# Patient Record
Sex: FEMALE | Employment: PART TIME | URBAN - METROPOLITAN AREA
[De-identification: names, ages, dates, MRNs, and addresses within clinical notes are randomized per-mention and may not be internally consistent; named-entity substitution may affect disease eponyms.]

---

## 2023-04-19 ENCOUNTER — OFFICE VISIT (OUTPATIENT)
Dept: ORTHOPEDIC SURGERY | Age: 23
End: 2023-04-19

## 2023-04-19 VITALS — HEIGHT: 63 IN | RESPIRATION RATE: 14 BRPM | BODY MASS INDEX: 28.35 KG/M2 | WEIGHT: 160 LBS

## 2023-04-19 DIAGNOSIS — M75.22 BICEPS TENDONITIS, LEFT: Primary | ICD-10-CM

## 2023-04-19 DIAGNOSIS — S43.431A SUPERIOR LABRUM ANTERIOR-TO-POSTERIOR (SLAP) TEAR OF RIGHT SHOULDER: ICD-10-CM

## 2023-04-19 DIAGNOSIS — M25.511 RIGHT SHOULDER PAIN, UNSPECIFIED CHRONICITY: ICD-10-CM

## 2023-04-19 RX ORDER — SPIRONOLACTONE 50 MG/1
50 TABLET, FILM COATED ORAL DAILY
COMMUNITY

## 2023-04-19 RX ORDER — DICLOFENAC SODIUM 75 MG/1
75 TABLET, DELAYED RELEASE ORAL 2 TIMES DAILY
Qty: 28 TABLET | Refills: 0 | Status: SHIPPED | OUTPATIENT
Start: 2023-04-19

## 2023-04-19 RX ORDER — NORETHINDRONE ACETATE AND ETHINYL ESTRADIOL 1MG-20(21)
1 KIT ORAL DAILY
COMMUNITY

## 2023-04-22 NOTE — PROGRESS NOTES
Orthopedic Shoulder Encounter Note     Chief complaint: right shoulder pain    HPI: [place]u [place] is a 25 y.o.  right-hand dominant female who presents for evaluation of his right shoulder. She indicates that she is to play softball. Plain 2 years ago. She was having quite a bit of pain in the last day of her plain. When she stopped playing her pain got better but her shoulder started bothering her more in the past year. Her pain is primarily localized to the anterior and posterior aspect of the shoulder. It is fairly constant. She does have some weakness and mild stiffness. She also has some pain at night. Her pain sometimes radiates distally into the biceps muscle. Previous treatment:    NSAIDs: Ibuprofen    Physical Therapy: No    Injections: None     Surgeries: None    Review of Systems:   Constitutional: Negative for fever, chills, sweats. Pain Score:   4  Neurological: Negative for headache, numbness, or weakness. Musculoskeletal: As noted in HPI     Past Medical History  Deysi  has no past medical history on file. Past Surgical History  Deysi  has no past surgical history on file. Current Medications  Current Outpatient Medications   Medication Sig Dispense Refill    norethindrone-ethinyl estradiol (JUNEL FE 1/20) 1-20 MG-MCG per tablet Take 1 tablet by mouth daily      spironolactone (ALDACTONE) 50 MG tablet Take 1 tablet by mouth daily      diclofenac (VOLTAREN) 75 MG EC tablet Take 1 tablet by mouth 2 times daily 28 tablet 0     No current facility-administered medications for this visit. Allergies  Allergies have been reviewed. Deysi has No Known Allergies. Social History  Deysi  reports that she has never smoked. She has never used smokeless tobacco. She reports that she does not currently use alcohol. She reports that she does not use drugs. Family History  Deysi's family history is not on file.      Physical Exam:     Resp 14   Ht 5' 3\" (1.6 m)   Wt 160 lb

## 2023-04-27 ENCOUNTER — HOSPITAL ENCOUNTER (OUTPATIENT)
Dept: PHYSICAL THERAPY | Facility: CLINIC | Age: 23
Setting detail: THERAPIES SERIES
Discharge: HOME | End: 2023-04-27
Payer: COMMERCIAL

## 2023-04-27 PROCEDURE — 97161 PT EVAL LOW COMPLEX 20 MIN: CPT

## 2023-04-27 PROCEDURE — 97110 THERAPEUTIC EXERCISES: CPT

## 2023-04-27 NOTE — CONSULTS
[] [facility]) [facility] &  Therapy  [address]  [phone]  F: [phone] [] [address]  [facility] 36   [address]  P: [phone]  F: [phone] [x] [address] &  Therapy  [address]  P: [phone]  F: [phone] [] [address]  P: [phone]  F: [phone] [] [address]  [facility]   [address]   [place]: [phone]  F: [phone]      Physical Therapy Upper Extremity Evaluation    Date:  2023  Patient: Montez Lara   : 2000  MRN: [id number]  Physician: Luiza Carrillo MD    Insurance: [employer]; Alonso yr; 42/42vs; no auth req; hard max; PT/OT comb; 20% coins; 975/0 met ded; 4350/0 met OOP  Medical Diagnosis:   Diagnosis   M75.22 (ICD-10-CM) - Biceps tendonitis, left   S43.431A (ICD-10-CM) - Superior labrum anterior-to-posterior (SLAP) tear of right shoulder     Rehab Codes: M25.511, M25.611, R29.3, M62.81, M25.311  Onset Date: 2020    Next 's appt: PRN    Subjective:   CC/HPI: Patient reports to physical therapy with (R) shoulder pain that radiates into (R) bicep. Patient reports that in the fall of  she played softball in college and began to notice increased (R) shoulder pain with insidious onset. States that pain is located anteriorly and posteriorly and radiates into (R) bicep. Patient describes the pain as dull and achy. States she has the most difficulty with lifting heavier objects or doing any activity overhead. Patient is unsure what makes the pain feel better at this time. Patient reports that she Dr. Ti Vanegas on 23 where x-rays were performed. Patient reports no findings at that time. Patient was referred for physical therapy and for cortisone injections.  Patient has not

## 2023-05-02 ENCOUNTER — HOSPITAL ENCOUNTER (OUTPATIENT)
Dept: PHYSICAL THERAPY | Facility: CLINIC | Age: 23
Setting detail: THERAPIES SERIES
Discharge: HOME | End: 2023-05-02
Payer: COMMERCIAL

## 2023-05-02 PROCEDURE — 97110 THERAPEUTIC EXERCISES: CPT

## 2023-05-02 NOTE — FLOWSHEET NOTE
[] [address]  [address]  [phone]  F: [phone] [] [address]  [facility] 36   [address]  P: [phone]  F: [phone] [x] [address]  Therapy  [address] [facility]  P: [phone]  F: [phone] [] [address]  P: [phone]  F: [phone] [] [address]  [facility]   [address]   [place]: [phone]  F: [phone]      Physical Therapy Daily Treatment Note    Date:  2023  Patient Name:  Emmy Najera    :  2000  MRN: [id number]  Physician: Yelena Herr MD                         Insurance: [employer]; Alonso yr; 42/42vs; no auth req; hard max; PT/OT comb; 20% coins; 975/0 met ded; 4350/0 met OOP  Medical Diagnosis:   Diagnosis   M75.22 (ICD-10-CM) - Biceps tendonitis, left   S43.431A (ICD-10-CM) - Superior labrum anterior-to-posterior (SLAP) tear of right shoulder      Rehab Codes: M25.511, M25.611, R29.3, M62.81, M25.311  Onset Date: 2020                      Next 's appt: PRN    Visit# / total visits: 2/12     Cancels/No Shows: 0/0    Subjective: Pt had no soreness after last tx. Pt described pain as achy/dull. Pt stated that there's constant soreness. Pain:  [x] Yes  [] No Location:  N/A Pain Rating: (0-10 scale) 2/10  Pain altered Tx:  [x] No  [] Yes  Action:  Comments:    Objective: Today's Treatment:  Modalities:   Precautions:  Exercise Reps/ Time Weight/ Level Comments   UBE 3/3  Fwd/bwd x   Pulleys  3/3  Flexion/scaption x         UT S 3x30\"   x   Levator Scap S 3x30\"   x   Doorway Pec S  3x30\"   x   Posterior Capsule S  3x30\"   x   IR Towel S  3x30\"   x         Wall Slides  x20           TB rows  x20 blue x   TB shoulder ext.   x20  blue x   SL ER 2x15  x   SL abd

## 2023-05-05 ENCOUNTER — HOSPITAL ENCOUNTER (OUTPATIENT)
Dept: PHYSICAL THERAPY | Facility: CLINIC | Age: 23
Setting detail: THERAPIES SERIES
Discharge: HOME | End: 2023-05-05
Payer: COMMERCIAL

## 2023-05-05 PROCEDURE — 97110 THERAPEUTIC EXERCISES: CPT

## 2023-05-05 NOTE — FLOWSHEET NOTE
[] [address]  [address]  [phone]  F: [phone] [] [address]  [employer] 36   [address]  P: [phone]  F: [phone] [x] [address]  Therapy  [address] [facility] [address]  P: [phone]  F: [phone] [] [address]  P: [phone]  F: [phone] [] [address]  [facility]   [address]   [place]: [phone]  F: [phone]      Physical Therapy Daily Treatment Note    Date:  2023  Patient Name:  Cm Wang    :  2000  MRN: [id number]  Physician: Arnulfo Caraballo MD                         Insurance: [employer]; Alonso yr; 42/42vs; no auth req; hard max; PT/OT comb; 20% coins; 975/0 met ded; 4350/0 met OOP  Medical Diagnosis:   Diagnosis   M75.22 (ICD-10-CM) - Biceps tendonitis, left   S43.431A (ICD-10-CM) - Superior labrum anterior-to-posterior (SLAP) tear of right shoulder      Rehab Codes: M25.511, M25.611, R29.3, M62.81, M25.311  Onset Date: 2020                      Next 's appt: PRN    Visit# / total visits: 312     Cancels/No Shows: 0/0    Subjective: Pt reporting soreness after previous visit but no pain. Pain:  [] Yes  [x] No Location: R shoulder, bicep Pain Rating: (0-10 scale) 0/10  Pain altered Tx:  [x] No  [] Yes  Action:  Comments:    Objective: Today's Treatment:  Modalities:   Precautions:  Exercise Reps/ Time Weight/ Level Comments   UBE 3/3  Fwd/bwd, not today    Pulleys  3/3  Flexion/scaption          UT S 3x30\"      Levator Scap S 3x30\"      Doorway Pec S  3x30\"      Posterior Capsule S  3x30\"      IR Towel S  3x30\"            Ball on wall 20x 1.5#    HAB on wall 2x10 orange          TB rows  2x15 blue    TB shoulder ext.   2x15 blue    TB HAB 2x15 blue

## 2023-05-09 ENCOUNTER — HOSPITAL ENCOUNTER (OUTPATIENT)
Dept: PHYSICAL THERAPY | Facility: CLINIC | Age: 23
Setting detail: THERAPIES SERIES
Discharge: HOME | End: 2023-05-09
Payer: COMMERCIAL

## 2023-05-09 PROCEDURE — 97110 THERAPEUTIC EXERCISES: CPT

## 2023-05-09 NOTE — FLOWSHEET NOTE
[] [address]  [address]  [phone]  F: [phone] [] [address]  [facility] 36   [address]  P: [phone]  F: [phone] [x] [address]  Therapy  [address] [facility]  P: [phone]  F: [phone] [] [address]  P: [phone]  F: [phone] [] [address]  [facility]   [address]   [place]: [phone]  F: [phone]      Physical Therapy Daily Treatment Note    Date:  2023  Patient Name:  Jonas Magana    :  2000  MRN: [id number]  Physician: Graham Bellamy MD                         Insurance: [employer]; Alonso yr; 42/42vs; no auth req; hard max; PT/OT comb; 20% coins; 975/0 met ded; 4350/0 met OOP  Medical Diagnosis:   Diagnosis   M75.22 (ICD-10-CM) - Biceps tendonitis, left   S43.431A (ICD-10-CM) - Superior labrum anterior-to-posterior (SLAP) tear of right shoulder      Rehab Codes: M25.511, M25.611, R29.3, M62.81, M25.311  Onset Date: 2020                      Next 's appt: PRN    Visit# / total visits: 412     Cancels/No Shows: 0/0    Subjective: Pt feeling some soreness after previous visit, but not too bad. No pain when arriving to therapy this date. Pain:  [] Yes  [x] No Location: R shoulder, bicep Pain Rating: (0-10 scale) 0/10  Pain altered Tx:  [x] No  [] Yes  Action:  Comments:    Objective:     Today's Treatment:Bold completed  Modalities:   Precautions:  Exercise Reps/ Time Weight/ Level Comments   UBE 3/3  Fwd/bwd, not today    Pulleys  3/3  Flexion/scaption          UT S 3x30\"      Levator Scap S 3x30\"      Doorway Pec S  3x30\"      Posterior Capsule S  3x30\"      IR Towel S  3x30\"            Ball on wall 20x 1.5#    HAB on wall 2x10 lime          TB rows  2x15 plum

## 2023-05-12 ENCOUNTER — HOSPITAL ENCOUNTER (OUTPATIENT)
Dept: PHYSICAL THERAPY | Facility: CLINIC | Age: 23
Setting detail: THERAPIES SERIES
Discharge: HOME | End: 2023-05-12
Payer: COMMERCIAL

## 2023-05-12 PROCEDURE — 97110 THERAPEUTIC EXERCISES: CPT

## 2023-05-12 NOTE — FLOWSHEET NOTE
[] [address]  [address]  [phone]  F: [phone] [] [address]  [facility] 36   [address]  P: [phone]  F: [phone] [x] [address]  Therapy  [address] [facility]  P: [phone]  F: [phone] [] [address]  P: [phone]  F: [phone] [] [address]  [facility]   [address]   [place]: [phone]  F: [phone]      Physical Therapy Daily Treatment Note    Date:  2023  Patient Name:  Flakita Heard    :  2000  MRN: [id number]  Physician: Reji Sigala MD                         Insurance: [employer]; Alonso yr; 42/42vs; no auth req; hard max; PT/OT comb; 20% coins; 975/0 met ded; 4350/0 met OOP  Medical Diagnosis:   Diagnosis   M75.22 (ICD-10-CM) - Biceps tendonitis, left   S43.431A (ICD-10-CM) - Superior labrum anterior-to-posterior (SLAP) tear of right shoulder      Rehab Codes: M25.511, M25.611, R29.3, M62.81, M25.311  Onset Date: 2020                      Next 's appt: PRN    Visit# / total visits:      Cancels/No Shows: 0/0    Subjective: Pt with no pain or complaints upon arrival.   Pain:  [] Yes  [x] No Location: R shoulder, bicep Pain Rating: (0-10 scale) 0/10  Pain altered Tx:  [x] No  [] Yes  Action:  Comments:    Objective:     Today's Treatment:Bold completed  Modalities:   Precautions:  Exercise Reps/ Time Weight/ Level Comments    UBE   Fwd/bwd  x   Doorway Pec S  3x30\"    x   Posterior Capsule S  3x30\"    x   IR Towel S  3x30\"              Ball on wall 30x 1.5#  x   HANDS on wall 3x10 Lime  HAB, 45°, down 45° x          Bands = HEP    x           Prone ITY's, 90/90° 3x10 1#  x          SL ER 2x15 1#  x   SL abd 2x15  1#  x   SL flexion  2x10 1#  x

## 2023-05-16 ENCOUNTER — HOSPITAL ENCOUNTER (OUTPATIENT)
Dept: PHYSICAL THERAPY | Facility: CLINIC | Age: 23
Setting detail: THERAPIES SERIES
Discharge: HOME | End: 2023-05-16
Payer: COMMERCIAL

## 2023-05-16 PROCEDURE — 97110 THERAPEUTIC EXERCISES: CPT

## 2023-05-16 NOTE — FLOWSHEET NOTE
[] [address]  [address]  [phone]  F: [phone] [] [address]  [facility] 36   [address]  P: [phone]  F: [phone] [x] [address]  Therapy  [address] [facility] [address]  P: [phone]  F: [phone] [] [address]  P: [phone]  F: [phone] [] [address]  [facility]   [address]   [place]: [phone]  F: [phone]      Physical Therapy Daily Treatment Note    Date:  2023  Patient Name:  Mickey Dyson    :  2000  MRN: [id number]  Physician: Delia Singer MD                         Insurance: [employer]; Alonso yr; 42/42vs; no auth req; hard max; PT/OT comb; 20% coins; 975/0 met ded; 4350/0 met OOP  Medical Diagnosis:   Diagnosis   M75.22 (ICD-10-CM) - Biceps tendonitis, left   S43.431A (ICD-10-CM) - Superior labrum anterior-to-posterior (SLAP) tear of right shoulder      Rehab Codes: M25.511, M25.611, R29.3, M62.81, M25.311  Onset Date: 2020                      Next 's appt: PRN    Visit# / total visits:      Cancels/No Shows: 0/0    Subjective: Pt having no pain when arriving to therapy. States she was a little sore after last treatment. Pain:  [] Yes  [x] No Location: R shoulder, bicep Pain Rating: (0-10 scale) 0/10  Pain altered Tx:  [x] No  [] Yes  Action:  Comments:    Objective:     Today's Treatment:Bold completed  Modalities:   Precautions:  Exercise Reps/ Time Weight/ Level Comments    UBE   Fwd/bwd  x   Doorway Pec S  3x30\"    x   Posterior Capsule S  3x30\"    x   IR Towel S  3x30\"              Ball on wall 30x 1.5#  x   HANDS on wall 3x10 Lime  HAB, 45°, down 45° x          Bands = HEP    x           Prone ITY's, 90/90° 3x10 1#  x          SL ER 2x15 2#  x   SL

## 2023-05-30 ENCOUNTER — HOSPITAL ENCOUNTER (OUTPATIENT)
Dept: PHYSICAL THERAPY | Facility: CLINIC | Age: 23
Setting detail: THERAPIES SERIES
Discharge: HOME | End: 2023-05-30
Payer: COMMERCIAL

## 2023-05-30 NOTE — FLOWSHEET NOTE
[] [address]  [address]    [phone]  F: [phone]   [] [address]  [facility] 36   [address]  P: [phone]  F: [phone]  [x] [address]  [address] [facility]  P: [phone]  F: [phone] [] [address]  P: [phone]  F: [phone]  [] [address]  [address] [facility] 70   [address]   [place]: [phone]  F: [phone]   [] [address]  [place]: [phone]   F: [phone]     Physical Therapy Cancel/No Show note    Date: 2023  Patient: Miracle Monroe  : 2000  MRN: [id number]    Cancels/No Shows to date: 1 cx / 0 ns    For today's appointment patient:    [x]  Cancelled    [x] Rescheduled appointment    [] No-show     Reason given by patient:    []  Patient ill    []  Conflicting appointment    [] No transportation      [x] Conflict with work    [] No reason given    [] Weather related    [] COVID-19    [] Other:      Comments:        [x] Next appointment was confirmed    Electronically signed by: Apple Hernandez

## 2023-06-07 ENCOUNTER — HOSPITAL ENCOUNTER (OUTPATIENT)
Dept: PHYSICAL THERAPY | Facility: CLINIC | Age: 23
Setting detail: THERAPIES SERIES
Discharge: HOME | End: 2023-06-07
Payer: COMMERCIAL

## 2023-06-07 PROCEDURE — 97530 THERAPEUTIC ACTIVITIES: CPT

## 2023-06-07 NOTE — DISCHARGE SUMMARY
life Patient reports UEFS as 0% functional impairment (MET)   Patient will improve (B) scapular strength to 4+/5 in order to increase ease of performing overhead tasks (MET)   Patient will improve (B) shoulder strength to 5/5 in order to increase ease of performing lifting activities at work (MET)     Treatment Charges: Mins Units   []  Modalities       []  Ther Exercise     []  Manual Therapy       [x]  Ther Activities  20 1    []  Aquatics       []  Vasocompression       []  Other       Total Time: 20 1         Treatment to Date:  [x] Therapeutic Exercise    [] Modalities:  [x] Therapeutic Activity    [] Ultrasound  [] Electrical Stimulation  [] Gait Training     [] Massage       [] Lumbar/Cervical Traction  [] Neuromuscular Re-education [] Cold/hotpack [] Iontophoresis: 4 mg/mL  [x] Instruction in Home Exercise Program                     Dexamethasone Sodium  [] Manual Therapy             Phosphate 40-80 mAmin  [] Aquatic Therapy                   [] Vasocompression/    [] Other:             Game Ready    Discharge Status:     [x] Pt recovered from conditions. Treatment goals were met. [] Pt received maximum benefit. No further therapy indicated at this time. [x] Pt to continue exercise/home instructions independently. [] Therapy interrupted due to:    [] Pt has 2 or more no shows/cancels, is discontinued per our policy. [] Pt has completed prescribed number of treatment sessions. [] Other:     Time In: 8:30 am            Time Out:    8:50 am    Electronically signed by Juve Jordan PT on 6/7/2023 at 8:45 AM      If you have any questions or concerns, please don't hesitate to call.   Thank you for your referral.

## 2023-09-15 SDOH — HEALTH STABILITY: PHYSICAL HEALTH: ON AVERAGE, HOW MANY DAYS PER WEEK DO YOU ENGAGE IN MODERATE TO STRENUOUS EXERCISE (LIKE A BRISK WALK)?: 1 DAY

## 2023-09-15 SDOH — HEALTH STABILITY: PHYSICAL HEALTH: ON AVERAGE, HOW MANY MINUTES DO YOU ENGAGE IN EXERCISE AT THIS LEVEL?: 20 MIN

## 2023-09-18 ENCOUNTER — OFFICE VISIT (OUTPATIENT)
Dept: PRIMARY CARE CLINIC | Age: 23
End: 2023-09-18
Payer: COMMERCIAL

## 2023-09-18 ENCOUNTER — HOSPITAL ENCOUNTER (OUTPATIENT)
Age: 23
Setting detail: SPECIMEN
Discharge: HOME | End: 2023-09-18

## 2023-09-18 VITALS
BODY MASS INDEX: 32.28 KG/M2 | HEART RATE: 88 BPM | WEIGHT: 182.2 LBS | OXYGEN SATURATION: 98 % | DIASTOLIC BLOOD PRESSURE: 88 MMHG | RESPIRATION RATE: 13 BRPM | HEIGHT: 63 IN | SYSTOLIC BLOOD PRESSURE: 128 MMHG

## 2023-09-18 DIAGNOSIS — Z13.29 SCREENING FOR THYROID DISORDER: ICD-10-CM

## 2023-09-18 DIAGNOSIS — Z13.0 SCREENING FOR DEFICIENCY ANEMIA: ICD-10-CM

## 2023-09-18 DIAGNOSIS — R03.0 ELEVATED BP WITHOUT DIAGNOSIS OF HYPERTENSION: Primary | ICD-10-CM

## 2023-09-18 DIAGNOSIS — Z13.220 SCREENING FOR LIPID DISORDERS: ICD-10-CM

## 2023-09-18 DIAGNOSIS — Z13.1 SCREENING FOR DIABETES MELLITUS: ICD-10-CM

## 2023-09-18 LAB
ALBUMIN SERPL-MCNC: 4.3 G/DL (ref 3.5–5.2)
ALBUMIN/GLOB SERPL: 1.4 {RATIO} (ref 1–2.5)
ALP SERPL-CCNC: 63 U/L (ref 35–104)
ALT SERPL-CCNC: 23 U/L (ref 5–33)
ANION GAP SERPL CALCULATED.3IONS-SCNC: 15 MMOL/L (ref 9–17)
AST SERPL-CCNC: 26 U/L
BILIRUB SERPL-MCNC: 0.3 MG/DL (ref 0.3–1.2)
BUN SERPL-MCNC: 15 MG/DL (ref 6–20)
CALCIUM SERPL-MCNC: 9.5 MG/DL (ref 8.6–10.4)
CHLORIDE SERPL-SCNC: 102 MMOL/L (ref 98–107)
CHOLEST SERPL-MCNC: 193 MG/DL
CHOLESTEROL/HDL RATIO: 3.3
CO2 SERPL-SCNC: 21 MMOL/L (ref 20–31)
CREAT SERPL-MCNC: 0.8 MG/DL (ref 0.5–0.9)
ERYTHROCYTE [DISTWIDTH] IN BLOOD BY AUTOMATED COUNT: 12.5 % (ref 11.8–14.4)
EST. AVERAGE GLUCOSE BLD GHB EST-MCNC: 97 MG/DL
GFR SERPL CREATININE-BSD FRML MDRD: >60 ML/MIN/1.73M2
GLUCOSE SERPL-MCNC: 68 MG/DL (ref 70–99)
HBA1C MFR BLD: 5 % (ref 4–6)
HCT VFR BLD AUTO: 42.4 % (ref 36.3–47.1)
HDLC SERPL-MCNC: 59 MG/DL
HGB BLD-MCNC: 13.6 G/DL (ref 11.9–15.1)
LDLC SERPL CALC-MCNC: 113 MG/DL (ref 0–130)
MCH RBC QN AUTO: 29.1 PG (ref 25.2–33.5)
MCHC RBC AUTO-ENTMCNC: 32.1 G/DL (ref 28.4–34.8)
MCV RBC AUTO: 90.8 FL (ref 82.6–102.9)
NRBC BLD-RTO: 0 PER 100 WBC
PLATELET # BLD AUTO: 332 K/UL (ref 138–453)
PMV BLD AUTO: 10.6 FL (ref 8.1–13.5)
POTASSIUM SERPL-SCNC: 4.3 MMOL/L (ref 3.7–5.3)
PROT SERPL-MCNC: 7.4 G/DL (ref 6.4–8.3)
RBC # BLD AUTO: 4.67 M/UL (ref 3.95–5.11)
SODIUM SERPL-SCNC: 138 MMOL/L (ref 135–144)
TRIGL SERPL-MCNC: 104 MG/DL
TSH SERPL DL<=0.05 MIU/L-ACNC: 3.37 UIU/ML (ref 0.3–5)
WBC OTHER # BLD: 7.2 K/UL (ref 3.5–11.3)

## 2023-09-18 PROCEDURE — 99204 OFFICE O/P NEW MOD 45 MIN: CPT | Performed by: NURSE PRACTITIONER

## 2023-09-18 RX ORDER — LISINOPRIL 20 MG/1
20 TABLET ORAL DAILY
Qty: 30 TABLET | Refills: 5 | Status: SHIPPED | OUTPATIENT
Start: 2023-09-18

## 2023-09-18 RX ORDER — BLOOD PRESSURE TEST KIT
1 KIT MISCELLANEOUS DAILY
Qty: 1 KIT | Refills: 0 | Status: SHIPPED | OUTPATIENT
Start: 2023-09-18

## 2023-09-18 SDOH — ECONOMIC STABILITY: FOOD INSECURITY: WITHIN THE PAST 12 MONTHS, THE FOOD YOU BOUGHT JUST DIDN'T LAST AND YOU DIDN'T HAVE MONEY TO GET MORE.: NEVER TRUE

## 2023-09-18 SDOH — ECONOMIC STABILITY: INCOME INSECURITY: HOW HARD IS IT FOR YOU TO PAY FOR THE VERY BASICS LIKE FOOD, HOUSING, MEDICAL CARE, AND HEATING?: NOT HARD AT ALL

## 2023-09-18 SDOH — ECONOMIC STABILITY: HOUSING INSECURITY
IN THE LAST 12 MONTHS, WAS THERE A TIME WHEN YOU DID NOT HAVE A STEADY PLACE TO SLEEP OR SLEPT IN A SHELTER (INCLUDING NOW)?: NO

## 2023-09-18 SDOH — ECONOMIC STABILITY: FOOD INSECURITY: WITHIN THE PAST 12 MONTHS, YOU WORRIED THAT YOUR FOOD WOULD RUN OUT BEFORE YOU GOT MONEY TO BUY MORE.: NEVER TRUE

## 2023-09-18 ASSESSMENT — PATIENT HEALTH QUESTIONNAIRE - PHQ9
SUM OF ALL RESPONSES TO PHQ9 QUESTIONS 1 & 2: 0
2. FEELING DOWN, DEPRESSED OR HOPELESS: 0
SUM OF ALL RESPONSES TO PHQ QUESTIONS 1-9: 0
1. LITTLE INTEREST OR PLEASURE IN DOING THINGS: 0
SUM OF ALL RESPONSES TO PHQ QUESTIONS 1-9: 0

## 2023-09-18 ASSESSMENT — ENCOUNTER SYMPTOMS
ABDOMINAL PAIN: 0
BACK PAIN: 0
SHORTNESS OF BREATH: 0
COUGH: 0

## 2023-09-18 NOTE — PROGRESS NOTES
[address]  PRIMARY CARE  [place] [id number] [address]  Dept: [phone]  Dept Fax: [phone]    Katheryn Salgado is a 21 y.o. female who presentstoday for her medical conditions/complaints as noted below. Katheryn Salgado is c/o of  Chief Complaint   Patient presents with    Establish Care    Migraine     Reflux      Gastroesophageal Reflux    Blood Pressure Check     BP running high            HPI:     Presents for new patient visit/est care  BP borderline in office  Checked at parents a couple of times  Got a reading 140s/100 in eye doctor office  BMI 32  In [place] at [employer], majoring in school PSY    Taking aldactone takes for acne, follows with derm    Right shoulder pain, has resolved  Worked with PT in the beginning of summer    Migraines gets once every few weeks, but gets headaches daily  Not able to do normal activities with migraine, lasts up to 24 hours  States it feels like a pressure band around her head that starts behind her left eye  Has tired advil without relief    Reflux notices right after she eats  Monitoring diet  Takes alkalizer tabs     Willing to update annual labs    Follows with Dr. William Solomon for GYN    Denies any other problems/concerns            No results found for: \"LABA1C\"          ( goal A1C is < 7)   No components found for: \"LABMICR\"  No results found for: \"LDLCHOLESTEROL\", \"LDLCALC\"    (goal LDL is <100)   No results found for: \"AST\", \"ALT\", \"BUN\", \"CR\"  BP Readings from Last 3 Encounters:   09/18/23 128/88          ([id number])    History reviewed. No pertinent past medical history. History reviewed. No pertinent surgical history.     Family History   Problem Relation Age of Onset    Miscarriages / Stillbirths Mother     High Blood Pressure Father     Cancer Maternal Grandfather     Coronary Art Dis Maternal Grandfather     Diabetes Maternal Grandfather         Type 2    High Blood Pressure Maternal Grandfather

## 2023-09-27 ENCOUNTER — OFFICE VISIT (OUTPATIENT)
Dept: PRIMARY CARE CLINIC | Age: 23
End: 2023-09-27
Payer: COMMERCIAL

## 2023-09-27 VITALS
BODY MASS INDEX: 32.07 KG/M2 | WEIGHT: 181 LBS | OXYGEN SATURATION: 98 % | RESPIRATION RATE: 13 BRPM | HEIGHT: 63 IN | SYSTOLIC BLOOD PRESSURE: 120 MMHG | HEART RATE: 87 BPM | DIASTOLIC BLOOD PRESSURE: 82 MMHG

## 2023-09-27 DIAGNOSIS — Z00.00 ENCOUNTER FOR GENERAL ADULT MEDICAL EXAMINATION W/O ABNORMAL FINDINGS: Primary | ICD-10-CM

## 2023-09-27 PROCEDURE — 90471 IMMUNIZATION ADMIN: CPT | Performed by: NURSE PRACTITIONER

## 2023-09-27 PROCEDURE — 90674 CCIIV4 VAC NO PRSV 0.5 ML IM: CPT | Performed by: NURSE PRACTITIONER

## 2023-09-27 PROCEDURE — 99395 PREV VISIT EST AGE 18-39: CPT | Performed by: NURSE PRACTITIONER

## 2023-09-27 SDOH — ECONOMIC STABILITY: FOOD INSECURITY: WITHIN THE PAST 12 MONTHS, THE FOOD YOU BOUGHT JUST DIDN'T LAST AND YOU DIDN'T HAVE MONEY TO GET MORE.: NEVER TRUE

## 2023-09-27 SDOH — ECONOMIC STABILITY: INCOME INSECURITY: HOW HARD IS IT FOR YOU TO PAY FOR THE VERY BASICS LIKE FOOD, HOUSING, MEDICAL CARE, AND HEATING?: NOT HARD AT ALL

## 2023-09-27 SDOH — ECONOMIC STABILITY: FOOD INSECURITY: WITHIN THE PAST 12 MONTHS, YOU WORRIED THAT YOUR FOOD WOULD RUN OUT BEFORE YOU GOT MONEY TO BUY MORE.: NEVER TRUE

## 2023-09-27 ASSESSMENT — PATIENT HEALTH QUESTIONNAIRE - PHQ9
1. LITTLE INTEREST OR PLEASURE IN DOING THINGS: 0
SUM OF ALL RESPONSES TO PHQ QUESTIONS 1-9: 0
SUM OF ALL RESPONSES TO PHQ9 QUESTIONS 1 & 2: 0
SUM OF ALL RESPONSES TO PHQ QUESTIONS 1-9: 0
2. FEELING DOWN, DEPRESSED OR HOPELESS: 0
SUM OF ALL RESPONSES TO PHQ QUESTIONS 1-9: 0
SUM OF ALL RESPONSES TO PHQ QUESTIONS 1-9: 0

## 2023-09-27 ASSESSMENT — ENCOUNTER SYMPTOMS
SHORTNESS OF BREATH: 0
BACK PAIN: 0
COUGH: 0
ABDOMINAL PAIN: 0

## 2023-09-27 NOTE — PROGRESS NOTES
[address]  PRIMARY CARE  [address] [address]  Dept: [phone]  Dept Fax: [phone]    Juan Manuel Bhatia is a 21 y.o. female who presentstoday for her medical conditions/complaints as noted below. Juan Manuel Bhatia is c/o of  Chief Complaint   Patient presents with    Annual Exam    Hypertension     Pt cuff is reading 120/94            HPI:     Presents for annual exam  BP well controlled, home readings are similar  Weight is stable    Tolerating lisinopril well, denies any side effects  Hasn't had any headaches after blood pressure under control    Annual labs up to date    Denies any other problems/concerns        Hemoglobin A1C (%)   Date Value   09/18/2023 5.0             ( goal A1C is < 7)   No components found for: \"LABMICR\"  LDL Cholesterol (mg/dL)   Date Value   09/18/2023 113       (goal LDL is <100)   AST (U/L)   Date Value   09/18/2023 26     ALT (U/L)   Date Value   09/18/2023 23     BUN (mg/dL)   Date Value   09/18/2023 15     BP Readings from Last 3 Encounters:   09/27/23 120/82   09/18/23 128/88          ([id number])    History reviewed. No pertinent past medical history. History reviewed. No pertinent surgical history.     Family History   Problem Relation Age of Onset    Miscarriages / Stillbirths Mother     High Blood Pressure Father     Cancer Maternal Grandfather     Coronary Art Dis Maternal Grandfather     Diabetes Maternal Grandfather         Type 2    High Blood Pressure Maternal Grandfather     High Cholesterol Maternal Grandfather     Cancer Maternal Grandmother     Gout Paternal Grandfather     High Blood Pressure Paternal Grandfather     High Cholesterol Paternal Grandfather     Cancer Paternal Grandmother     Colon Cancer Paternal Grandmother     Heart Attack Paternal Grandmother     High Cholesterol Paternal Grandmother     Stroke Paternal Grandmother     Diabetes Maternal Uncle         Type 2    High Cholesterol

## 2024-03-31 ASSESSMENT — PATIENT HEALTH QUESTIONNAIRE - PHQ9
1. LITTLE INTEREST OR PLEASURE IN DOING THINGS: NOT AT ALL
SUM OF ALL RESPONSES TO PHQ9 QUESTIONS 1 & 2: 0
2. FEELING DOWN, DEPRESSED OR HOPELESS: NOT AT ALL
SUM OF ALL RESPONSES TO PHQ QUESTIONS 1-9: 0
2. FEELING DOWN, DEPRESSED OR HOPELESS: NOT AT ALL
SUM OF ALL RESPONSES TO PHQ QUESTIONS 1-9: 0
SUM OF ALL RESPONSES TO PHQ QUESTIONS 1-9: 0
1. LITTLE INTEREST OR PLEASURE IN DOING THINGS: NOT AT ALL
SUM OF ALL RESPONSES TO PHQ9 QUESTIONS 1 & 2: 0
SUM OF ALL RESPONSES TO PHQ QUESTIONS 1-9: 0

## 2024-04-02 ENCOUNTER — OFFICE VISIT (OUTPATIENT)
Dept: PRIMARY CARE CLINIC | Age: 24
End: 2024-04-02
Payer: COMMERCIAL

## 2024-04-02 VITALS
SYSTOLIC BLOOD PRESSURE: 118 MMHG | BODY MASS INDEX: 32.11 KG/M2 | OXYGEN SATURATION: 98 % | HEART RATE: 88 BPM | DIASTOLIC BLOOD PRESSURE: 82 MMHG | HEIGHT: 63 IN | WEIGHT: 181.2 LBS | RESPIRATION RATE: 15 BRPM

## 2024-04-02 DIAGNOSIS — R03.0 ELEVATED BP WITHOUT DIAGNOSIS OF HYPERTENSION: Primary | ICD-10-CM

## 2024-04-02 PROCEDURE — 99214 OFFICE O/P EST MOD 30 MIN: CPT | Performed by: NURSE PRACTITIONER

## 2024-04-02 RX ORDER — LISINOPRIL 20 MG/1
20 TABLET ORAL DAILY
Qty: 90 TABLET | Refills: 3 | Status: SHIPPED | OUTPATIENT
Start: 2024-04-02

## 2024-04-02 SDOH — ECONOMIC STABILITY: FOOD INSECURITY: WITHIN THE PAST 12 MONTHS, YOU WORRIED THAT YOUR FOOD WOULD RUN OUT BEFORE YOU GOT MONEY TO BUY MORE.: NEVER TRUE

## 2024-04-02 SDOH — ECONOMIC STABILITY: FOOD INSECURITY: WITHIN THE PAST 12 MONTHS, THE FOOD YOU BOUGHT JUST DIDN'T LAST AND YOU DIDN'T HAVE MONEY TO GET MORE.: NEVER TRUE

## 2024-04-02 SDOH — ECONOMIC STABILITY: INCOME INSECURITY: HOW HARD IS IT FOR YOU TO PAY FOR THE VERY BASICS LIKE FOOD, HOUSING, MEDICAL CARE, AND HEATING?: NOT HARD AT ALL

## 2024-04-02 ASSESSMENT — ENCOUNTER SYMPTOMS
BACK PAIN: 0
SHORTNESS OF BREATH: 0
COUGH: 0
ABDOMINAL PAIN: 0

## 2024-04-02 NOTE — PROGRESS NOTES
[facility]  [facility] PRIMARY CARE  [address] [place] [address] [address]  [address]  [facility] [address]  Dept: [phone]  Dept Fax: [phone]    Deysi Johnson is a 23 y.o. female who presentstoday for her medical conditions/complaints as noted below.  Deysi Johnson is c/o of  Chief Complaint   Patient presents with    6 Month Follow-Up    Hypertension       HPI:     Presents for 6 month recheck  BP well controlled today  She spot checks at home and has similar readings  Weight is stable    Occasional headaches/dizziness  Nothing that is consistent    Labs up to date    Denies any other problems/concerns        Hemoglobin A1C (%)   Date Value   09/18/2023 5.0             ( goal A1C is < 7)   No components found for: \"LABMICR\"  LDL Cholesterol (mg/dL)   Date Value   09/18/2023 113       (goal LDL is <100)   AST (U/L)   Date Value   09/18/2023 26     ALT (U/L)   Date Value   09/18/2023 23     BUN (mg/dL)   Date Value   09/18/2023 15     BP Readings from Last 3 Encounters:   04/02/24 118/82   09/27/23 120/82   09/18/23 128/88          ([id number])    History reviewed. No pertinent past medical history.   History reviewed. No pertinent surgical history.    Family History   Problem Relation Age of Onset    Miscarriages / Stillbirths Mother     High Blood Pressure Father     Cancer Maternal Grandfather     Coronary Art Dis Maternal Grandfather     Diabetes Maternal Grandfather         Type 2    High Blood Pressure Maternal Grandfather     High Cholesterol Maternal Grandfather     Cancer Maternal Grandmother     Gout Paternal Grandfather     High Blood Pressure Paternal Grandfather     High Cholesterol Paternal Grandfather     Cancer Paternal Grandmother     Colon Cancer Paternal Grandmother     Heart Attack Paternal Grandmother     High Cholesterol Paternal Grandmother     Stroke Paternal Grandmother     Diabetes Maternal Uncle         Type 2    High Cholesterol Maternal Uncle     High Cholesterol Maternal

## 2024-10-02 ENCOUNTER — HOSPITAL ENCOUNTER (OUTPATIENT)
Age: 24
Setting detail: SPECIMEN
Discharge: HOME | End: 2024-10-02

## 2024-10-02 ENCOUNTER — OFFICE VISIT (OUTPATIENT)
Dept: PRIMARY CARE CLINIC | Age: 24
End: 2024-10-02
Payer: COMMERCIAL

## 2024-10-02 VITALS
OXYGEN SATURATION: 97 % | DIASTOLIC BLOOD PRESSURE: 78 MMHG | HEIGHT: 63 IN | BODY MASS INDEX: 32 KG/M2 | WEIGHT: 180.6 LBS | SYSTOLIC BLOOD PRESSURE: 114 MMHG | HEART RATE: 73 BPM | RESPIRATION RATE: 15 BRPM

## 2024-10-02 DIAGNOSIS — Z13.0 SCREENING FOR DEFICIENCY ANEMIA: ICD-10-CM

## 2024-10-02 DIAGNOSIS — Z13.29 SCREENING FOR THYROID DISORDER: ICD-10-CM

## 2024-10-02 DIAGNOSIS — Z13.220 SCREENING FOR LIPID DISORDERS: ICD-10-CM

## 2024-10-02 DIAGNOSIS — Z13.1 SCREENING FOR DIABETES MELLITUS: ICD-10-CM

## 2024-10-02 DIAGNOSIS — Z23 NEED FOR INFLUENZA VACCINATION: ICD-10-CM

## 2024-10-02 DIAGNOSIS — Z00.00 ENCOUNTER FOR GENERAL ADULT MEDICAL EXAMINATION W/O ABNORMAL FINDINGS: Primary | ICD-10-CM

## 2024-10-02 LAB
ALBUMIN SERPL-MCNC: 4.6 G/DL (ref 3.5–5.2)
ALBUMIN/GLOB SERPL: 2 {RATIO} (ref 1–2.5)
ALP SERPL-CCNC: 62 U/L (ref 35–104)
ALT SERPL-CCNC: 30 U/L (ref 10–35)
ANION GAP SERPL CALCULATED.3IONS-SCNC: 11 MMOL/L (ref 9–16)
AST SERPL-CCNC: 22 U/L (ref 10–35)
BILIRUB SERPL-MCNC: 0.5 MG/DL (ref 0–1.2)
BUN SERPL-MCNC: 13 MG/DL (ref 6–20)
CALCIUM SERPL-MCNC: 9.9 MG/DL (ref 8.6–10.4)
CHLORIDE SERPL-SCNC: 105 MMOL/L (ref 98–107)
CHOLEST SERPL-MCNC: 186 MG/DL (ref 0–199)
CHOLESTEROL/HDL RATIO: 3
CO2 SERPL-SCNC: 24 MMOL/L (ref 20–31)
CREAT SERPL-MCNC: 0.9 MG/DL (ref 0.5–0.9)
ERYTHROCYTE [DISTWIDTH] IN BLOOD BY AUTOMATED COUNT: 12.2 % (ref 11.8–14.4)
EST. AVERAGE GLUCOSE BLD GHB EST-MCNC: 100 MG/DL
GFR, ESTIMATED: >90 ML/MIN/1.73M2
GLUCOSE SERPL-MCNC: 89 MG/DL (ref 74–99)
HBA1C MFR BLD: 5.1 % (ref 4–6)
HCT VFR BLD AUTO: 42.8 % (ref 36.3–47.1)
HDLC SERPL-MCNC: 59 MG/DL
HGB BLD-MCNC: 13.8 G/DL (ref 11.9–15.1)
LDLC SERPL CALC-MCNC: 113 MG/DL (ref 0–100)
MCH RBC QN AUTO: 28.9 PG (ref 25.2–33.5)
MCHC RBC AUTO-ENTMCNC: 32.2 G/DL (ref 28.4–34.8)
MCV RBC AUTO: 89.7 FL (ref 82.6–102.9)
NRBC BLD-RTO: 0 PER 100 WBC
PLATELET # BLD AUTO: 317 K/UL (ref 138–453)
PMV BLD AUTO: 10.3 FL (ref 8.1–13.5)
POTASSIUM SERPL-SCNC: 4.7 MMOL/L (ref 3.7–5.3)
PROT SERPL-MCNC: 7.4 G/DL (ref 6.6–8.7)
RBC # BLD AUTO: 4.77 M/UL (ref 3.95–5.11)
SODIUM SERPL-SCNC: 140 MMOL/L (ref 136–145)
TRIGL SERPL-MCNC: 72 MG/DL
TSH SERPL DL<=0.05 MIU/L-ACNC: 2.05 UIU/ML (ref 0.27–4.2)
VLDLC SERPL CALC-MCNC: 14 MG/DL
WBC OTHER # BLD: 7.5 K/UL (ref 3.5–11.3)

## 2024-10-02 PROCEDURE — 90471 IMMUNIZATION ADMIN: CPT | Performed by: NURSE PRACTITIONER

## 2024-10-02 PROCEDURE — 99395 PREV VISIT EST AGE 18-39: CPT | Performed by: NURSE PRACTITIONER

## 2024-10-02 PROCEDURE — 90661 CCIIV3 VAC ABX FR 0.5 ML IM: CPT | Performed by: NURSE PRACTITIONER

## 2024-10-02 RX ORDER — DROSPIRENONE 4 MG/1
4 TABLET, FILM COATED ORAL DAILY
COMMUNITY
Start: 2024-08-01

## 2024-10-02 SDOH — ECONOMIC STABILITY: INCOME INSECURITY: HOW HARD IS IT FOR YOU TO PAY FOR THE VERY BASICS LIKE FOOD, HOUSING, MEDICAL CARE, AND HEATING?: NOT HARD AT ALL

## 2024-10-02 SDOH — ECONOMIC STABILITY: FOOD INSECURITY: WITHIN THE PAST 12 MONTHS, THE FOOD YOU BOUGHT JUST DIDN'T LAST AND YOU DIDN'T HAVE MONEY TO GET MORE.: NEVER TRUE

## 2024-10-02 SDOH — ECONOMIC STABILITY: FOOD INSECURITY: WITHIN THE PAST 12 MONTHS, YOU WORRIED THAT YOUR FOOD WOULD RUN OUT BEFORE YOU GOT MONEY TO BUY MORE.: NEVER TRUE

## 2024-10-02 ASSESSMENT — PATIENT HEALTH QUESTIONNAIRE - PHQ9
SUM OF ALL RESPONSES TO PHQ QUESTIONS 1-9: 0
1. LITTLE INTEREST OR PLEASURE IN DOING THINGS: NOT AT ALL
SUM OF ALL RESPONSES TO PHQ9 QUESTIONS 1 & 2: 0
2. FEELING DOWN, DEPRESSED OR HOPELESS: NOT AT ALL
SUM OF ALL RESPONSES TO PHQ QUESTIONS 1-9: 0

## 2024-10-02 ASSESSMENT — ENCOUNTER SYMPTOMS
BACK PAIN: 0
COUGH: 0
ABDOMINAL PAIN: 0
SHORTNESS OF BREATH: 0

## 2024-10-02 NOTE — PROGRESS NOTES
[facility]  [facility] PRIMARY CARE  [address] [place] [address] [address]  [address]  [facility] [address]  Dept: [phone]  Dept Fax: [phone]    Deysi Johnson is a 24 y.o. female who presentstoday for her medical conditions/complaints as noted below.  Deysi Johnson is c/o of  Chief Complaint   Patient presents with    Annual Exam         HPI:     Presents for annual exam  BP well controlled, did take lisinopril this am  Has lost 1lb since LOV    Met with OB over the summer and OCP was changed  BP has improved with this change  Using lisinopril as needed  Will monitor BP at home and only take if BP is greater than 120/80    Overall feeling well  Willing to update annual labs  Updated flu vaccine    Denies any other problems/concerns        Hemoglobin A1C (%)   Date Value   09/18/2023 5.0             ( goal A1C is < 7)   No components found for: \"LABMICR\"  No components found for: \"LDLCHOLESTEROL\", \"LDLCALC\"    (goal LDL is <100)   AST (U/L)   Date Value   09/18/2023 26     ALT (U/L)   Date Value   09/18/2023 23     BUN (mg/dL)   Date Value   09/18/2023 15     BP Readings from Last 3 Encounters:   10/02/24 114/78   04/02/24 118/82   09/27/23 120/82          ([id number])    History reviewed. No pertinent past medical history.   History reviewed. No pertinent surgical history.    Family History   Problem Relation Age of Onset    Miscarriages / Stillbirths Mother     High Blood Pressure Father     Cancer Maternal Grandfather     Coronary Art Dis Maternal Grandfather     Diabetes Maternal Grandfather         Type 2    High Blood Pressure Maternal Grandfather     High Cholesterol Maternal Grandfather     Cancer Maternal Grandmother     Gout Paternal Grandfather     High Blood Pressure Paternal Grandfather     High Cholesterol Paternal Grandfather     Cancer Paternal Grandmother     Colon Cancer Paternal Grandmother     Heart Attack Paternal Grandmother     High Cholesterol Paternal Grandmother     Stroke

## 2024-10-02 NOTE — PROGRESS NOTES
Vaccine Information Sheet, \"Influenza - Inactivated\"  given to Deysi Johnson, or parent/legal guardian of  Deysi Johnson and verbalized understanding.   Injection was given in Left deltoid by Radha Shahid MA.     Patient responses:  Have you ever had a reaction to a flu vaccine? No  Are you able to eat eggs without adverse effects?  Yes  Do you have any current illness?  No  Have you ever had [place] Syndrome?  No    Flu vaccine given per order. Please see immunization tab.  Patient tolerated injection & left the office a few minutes later feeling well.

## 2024-11-11 ENCOUNTER — E-VISIT (OUTPATIENT)
Dept: PRIMARY CARE CLINIC | Age: 24
End: 2024-11-11
Payer: COMMERCIAL

## 2024-11-11 DIAGNOSIS — E34.9 ENDOCRINE DISORDER: Primary | ICD-10-CM

## 2024-11-11 DIAGNOSIS — Z13.29 ENCOUNTER FOR SCREENING FOR OTHER SUSPECTED ENDOCRINE DISORDER: Primary | ICD-10-CM

## 2024-11-11 PROCEDURE — 99422 OL DIG E/M SVC 11-20 MIN: CPT | Performed by: NURSE PRACTITIONER

## 2024-11-15 ENCOUNTER — HOSPITAL ENCOUNTER (OUTPATIENT)
Age: 24
Setting detail: SPECIMEN
Discharge: HOME | End: 2024-11-15

## 2024-11-15 DIAGNOSIS — Z13.29 ENCOUNTER FOR SCREENING FOR OTHER SUSPECTED ENDOCRINE DISORDER: ICD-10-CM

## 2024-11-15 LAB
CORTIS SERPL-MCNC: 14 UG/DL (ref 2.5–19.5)
CORTISOL COLLECTION INFO: 736
T3FREE SERPL-MCNC: 4 PG/ML (ref 2–4.4)
T4 FREE SERPL-MCNC: 1.3 NG/DL (ref 0.92–1.68)
TSH SERPL DL<=0.05 MIU/L-ACNC: 1.7 UIU/ML (ref 0.27–4.2)

## 2024-11-17 LAB
THYROGLOBULIN AB: 20 IU/ML (ref 0–40)
THYROPEROXIDASE AB SERPL IA-ACNC: 4.1 IU/ML (ref 0–25)

## 2024-11-19 LAB — T3 REVERSE: 15.2 NG/DL (ref 9–27)
